# Patient Record
Sex: MALE | Race: OTHER
[De-identification: names, ages, dates, MRNs, and addresses within clinical notes are randomized per-mention and may not be internally consistent; named-entity substitution may affect disease eponyms.]

---

## 2020-08-24 ENCOUNTER — HOSPITAL ENCOUNTER (OUTPATIENT)
Dept: HOSPITAL 72 - WCC | Age: 55
LOS: 7 days | Discharge: HOME | End: 2020-08-31
Payer: COMMERCIAL

## 2020-08-24 DIAGNOSIS — Z86.711: ICD-10-CM

## 2020-08-24 DIAGNOSIS — Z79.01: ICD-10-CM

## 2020-08-24 DIAGNOSIS — Z96.652: ICD-10-CM

## 2020-08-24 DIAGNOSIS — Z86.718: ICD-10-CM

## 2020-08-24 DIAGNOSIS — M05.662: ICD-10-CM

## 2020-08-24 DIAGNOSIS — T86.828: Primary | ICD-10-CM

## 2020-08-24 DIAGNOSIS — L03.116: ICD-10-CM

## 2020-08-24 PROCEDURE — 11042 DBRDMT SUBQ TIS 1ST 20SQCM/<: CPT

## 2020-08-31 ENCOUNTER — HOSPITAL ENCOUNTER (OUTPATIENT)
Dept: HOSPITAL 72 - CAT | Age: 55
Discharge: HOME | End: 2020-08-31
Payer: COMMERCIAL

## 2020-08-31 DIAGNOSIS — R22.42: ICD-10-CM

## 2020-08-31 DIAGNOSIS — M25.462: ICD-10-CM

## 2020-08-31 DIAGNOSIS — R05: Primary | ICD-10-CM

## 2020-08-31 PROCEDURE — 71045 X-RAY EXAM CHEST 1 VIEW: CPT

## 2020-08-31 PROCEDURE — 73701 CT LOWER EXTREMITY W/DYE: CPT

## 2020-08-31 NOTE — DIAGNOSTIC IMAGING REPORT
Indication: Knee swelling

 

Technique: IV administration nonionic contrast.  Spiral acquisitions obtained through

the right knee   Multiplanar  reconstructions were generated. Total dose length

product 352 mGycm.  CTDIvol(s) 9 mGy. Radiation dose was minimized using automated

exposure control

 

Comparison: none

 

Findings: There is a right knee prosthesis. This appears well aligned and intact.

There is a large joint effusion. This extends cephalad in the suprapatellar bursa

beyond the imaging volume. The upper portion of the suprapatellar bursa portion of

the effusion, there are some gas bubbles. The diffusion is thick-walled within the

suprapatellar bursa. Effusion extends caudad to the level of the tibial tubercle. No

extra-articular fluid collection is demonstrated. There is an incisional scar

anterior to the knee joint. There is some infiltration of the subcutaneous fat

anterior to the knee joint and medial to the joint. There is some edema tracking

along the fascial planes posteriorly inferior to the level of the knee joint.

 

No osteolytic lesion or other findings to suggest acute osteomyelitis. No worrisome

periprosthetic lucency.

 

Impression: Postsurgical changes, as described

 

Large joint effusion. Gas bubbles within the joint fluid may be residual from surgery

which was per report approximately one month ago, but could also indicate infection

with a gas-forming organism. Wall thickening of the filled suprapatellar bursa it is

likely related to history of multiple prior surgeries, but the possibility of septic

arthritis as etiology of this finding should be considered.

 

Soft tissue thickening and edema, may indicate cellulitis, versus edema of

hemodynamic origin.

 

No evidence of extra-articular soft tissue abscess

 

Images previously reviewed in person with Dr. Michelle

 

 

 

The CT scanner at [facility] is accredited by the [employer]

[employer] and the scans are performed using protocols designed to limit radiation

exposure to as low as reasonably achievable to attain images of sufficient resolution

adequate for diagnostic evaluation.

## 2020-09-02 ENCOUNTER — HOSPITAL ENCOUNTER (OUTPATIENT)
Dept: HOSPITAL 72 - WCC | Age: 55
LOS: 28 days | Discharge: HOME | End: 2020-09-30
Payer: COMMERCIAL

## 2020-09-02 VITALS — BODY MASS INDEX: 0.19 KG/M2 | WEIGHT: 1 LBS | HEIGHT: 60 IN

## 2020-09-02 DIAGNOSIS — L97.822: ICD-10-CM

## 2020-09-02 DIAGNOSIS — T86.828: Primary | ICD-10-CM

## 2020-09-02 PROCEDURE — 11042 DBRDMT SUBQ TIS 1ST 20SQCM/<: CPT

## 2020-09-02 PROCEDURE — 97605 NEG PRS WND THER DME<=50SQCM: CPT

## 2020-10-01 ENCOUNTER — HOSPITAL ENCOUNTER (OUTPATIENT)
Dept: HOSPITAL 72 - WCC | Age: 55
LOS: 30 days | Discharge: HOME | End: 2020-10-31
Payer: COMMERCIAL

## 2020-10-01 DIAGNOSIS — T86.828: Primary | ICD-10-CM

## 2020-10-01 DIAGNOSIS — L97.822: ICD-10-CM

## 2020-10-01 PROCEDURE — 11042 DBRDMT SUBQ TIS 1ST 20SQCM/<: CPT

## 2020-10-01 PROCEDURE — 97605 NEG PRS WND THER DME<=50SQCM: CPT

## 2020-11-02 ENCOUNTER — HOSPITAL ENCOUNTER (OUTPATIENT)
Dept: HOSPITAL 72 - WCC | Age: 55
LOS: 28 days | Discharge: HOME | End: 2020-11-30
Payer: COMMERCIAL

## 2020-11-02 DIAGNOSIS — T86.828: Primary | ICD-10-CM

## 2020-11-02 DIAGNOSIS — Z79.899: ICD-10-CM

## 2020-11-02 DIAGNOSIS — M06.9: ICD-10-CM

## 2020-11-02 DIAGNOSIS — Z79.01: ICD-10-CM

## 2020-11-02 DIAGNOSIS — Z96.652: ICD-10-CM

## 2020-11-02 PROCEDURE — 11042 DBRDMT SUBQ TIS 1ST 20SQCM/<: CPT
